# Patient Record
(demographics unavailable — no encounter records)

---

## 2025-05-09 NOTE — DISCUSSION/SUMMARY
[FreeTextEntry1] : 54 y/o with hx of persistent ASCUS pap, but HPV neg past 2 years and with JOIE 1 colposcopy in 2022. Also likely entering menopause, had questions about this. - Reviewed that in context of ASCUS HPV neg pap in 2025 and 2024, ASCUS HPV+ pap in 2023, and ASCUS pap s/p colposcopy with JOIE 1 in 2022, [facility] recommendations are actually to repeat pap in 1 year, not perform colposcopy today. Reviewed the nature of cervical cancer risk, slow development overtime, and the role of HPV in this, reassured patient that without HPV risk is very low and that annual pap is already close follow up as completely normal paps are only repeated every 3-5 years. Pt expressed understanding. - Reviewed possible menopausal symptoms, advised of option of HRT but that everyone's experience is different and if symptoms do not develop or do develop and are not bothersome, HRT is not needed, only needed to treat symptoms. Also advised may have another menses or two in upcoming months, but that if menses does not return and she gets any bleeding again as soon as 3 months from now, she should present for further evaluation. As long as she has no further bleeding, no further evaluation needed. Pt expressed understanding. - Return for annual visit and pap next year, or as needed  Flavio Calderón MD

## 2025-05-09 NOTE — HISTORY OF PRESENT ILLNESS
[N] : Patient reports normal menses [Y] : Positive pregnancy history [FreeTextEntry1] : 54 y/o Originally presenting for colposcopy. Also reports today that menses has not returned since February, has questions about menopause symptoms and bleeding expectations. [Mammogramdate] : 2/28/2025 [TextBox_19] : BR 0 [PapSmeardate] : 4/12/2025 [TextBox_31] : ASC-US [ColonoscopyDate] : 9/22/2022 [HIVDate] : 8/17/2022 [TextBox_53] : NEG [SyphilisDate] : 8/17/2022 [TextBox_58] : NEG [GonorrheaDate] : 2/27/2024 [TextBox_63] : NEG [ChlamydiaDate] : 2/27/2024 [TextBox_68] : NEG [HPVDate] : 4/12/2025 [TextBox_78] : NEG [HepatitisBDate] : 8/17/2022 [TextBox_83] : NEG [HepatitisCDate] : 8/17/2022 [TextBox_88] : NEG [PGxTotal] : 1 [[facility]annerxFulerm] : 1 [[facility]] : 1